# Patient Record
(demographics unavailable — no encounter records)

---

## 2021-05-01 NOTE — REPVR
PROCEDURE INFORMATION: 

Exam: US Nonobstetric Pelvis; Complete 

Exam date and time: 5/1/2021 9:46 PM 

Age: 21 years old 

Clinical indication: Pelvic pain; Additional info: Iud verification, inserted 2 

weeks ago, cramping, bleeding 



TECHNIQUE: 

Imaging protocol: Transabdominal pelvic nonobstetric ultrasound. Complete exam. 

Real time ultrasound with image documentation. 



COMPARISON: 

No relevant prior studies available. 



FINDINGS: 

Uterus/cervix: Uterus measures 8.7 x 3.2 x 5.8 cm. IUD located centrally within 

the endometrial cavity. The endometrial echo complex measures 6 mm maximally. 

Right adnexa: Right ovary measures 1.9 x 1.3 x 2.5 cm. Normal flow. 

Left adnexa: Left ovary measures 3.7 x 3.4 x 2.8 cm. Normal flow. Non complex 

cyst measures 2.4 x 2.3 x 3 cm likely representing a functional cyst. 

Intraperitoneal space: No intraperitoneal fluid.  

Urinary bladder: Normal. 



IMPRESSION: 

Unremarkable examination status post insertion of IUD.



Electronically signed by: Margarito Greenfield On 05/01/2021  22:03:15 PM